# Patient Record
Sex: FEMALE | Race: WHITE | NOT HISPANIC OR LATINO | Employment: FULL TIME | URBAN - NONMETROPOLITAN AREA
[De-identification: names, ages, dates, MRNs, and addresses within clinical notes are randomized per-mention and may not be internally consistent; named-entity substitution may affect disease eponyms.]

---

## 2023-06-13 LAB — CHORIOGONADOTROPIN (MIU/ML) IN SER/PLAS: 1621 MIU/ML

## 2023-06-15 LAB — CHORIOGONADOTROPIN (MIU/ML) IN SER/PLAS: 1462 MIU/ML

## 2023-06-30 LAB — CHORIOGONADOTROPIN (MIU/ML) IN SER/PLAS: 23 MIU/ML

## 2023-07-14 LAB — CHORIOGONADOTROPIN (MIU/ML) IN SER/PLAS: <2 MIU/ML

## 2023-09-29 ENCOUNTER — TRANSCRIBE ORDERS (OUTPATIENT)
Dept: OBSTETRICS AND GYNECOLOGY | Facility: CLINIC | Age: 38
End: 2023-09-29
Payer: COMMERCIAL

## 2023-09-29 DIAGNOSIS — D24.2 BREAST FIBROADENOMA IN FEMALE, LEFT: Primary | ICD-10-CM

## 2023-10-06 PROBLEM — D24.2 FIBROADENOMA OF LEFT BREAST: Status: ACTIVE | Noted: 2023-10-06

## 2023-10-06 PROBLEM — O03.9 SAB (SPONTANEOUS ABORTION) (HHS-HCC): Status: ACTIVE | Noted: 2023-10-06

## 2023-10-06 PROBLEM — O20.0 THREATENED ABORTION (HHS-HCC): Status: ACTIVE | Noted: 2023-10-06

## 2023-10-06 PROBLEM — N63.23 MASS OF LOWER OUTER QUADRANT OF LEFT BREAST: Status: ACTIVE | Noted: 2023-10-06

## 2023-10-06 PROBLEM — N92.6 MISSED MENSES: Status: ACTIVE | Noted: 2023-10-06

## 2023-10-06 PROBLEM — N91.2 AMENORRHEA: Status: ACTIVE | Noted: 2023-10-06

## 2023-10-06 RX ORDER — ONDANSETRON 8 MG/1
1 TABLET, ORALLY DISINTEGRATING ORAL EVERY 6 HOURS
COMMUNITY
Start: 2022-07-25

## 2023-10-11 ENCOUNTER — LAB (OUTPATIENT)
Dept: LAB | Facility: LAB | Age: 38
End: 2023-10-11
Payer: COMMERCIAL

## 2023-10-11 ENCOUNTER — ROUTINE PRENATAL (OUTPATIENT)
Dept: OBSTETRICS AND GYNECOLOGY | Facility: CLINIC | Age: 38
End: 2023-10-11
Payer: COMMERCIAL

## 2023-10-11 VITALS — DIASTOLIC BLOOD PRESSURE: 62 MMHG | WEIGHT: 137 LBS | SYSTOLIC BLOOD PRESSURE: 104 MMHG | BODY MASS INDEX: 23.15 KG/M2

## 2023-10-11 DIAGNOSIS — Z3A.15 15 WEEKS GESTATION OF PREGNANCY (HHS-HCC): ICD-10-CM

## 2023-10-11 DIAGNOSIS — Z3A.15 15 WEEKS GESTATION OF PREGNANCY (HHS-HCC): Primary | ICD-10-CM

## 2023-10-11 DIAGNOSIS — N63.23 MASS OF LOWER OUTER QUADRANT OF LEFT BREAST: ICD-10-CM

## 2023-10-11 LAB
ABO GROUP (TYPE) IN BLOOD: NORMAL
ERYTHROCYTE [DISTWIDTH] IN BLOOD BY AUTOMATED COUNT: 12.7 % (ref 11.5–14.5)
HCT VFR BLD AUTO: 32 % (ref 36–46)
HGB BLD-MCNC: 10.5 G/DL (ref 12–16)
MCH RBC QN AUTO: 31.9 PG (ref 26–34)
MCHC RBC AUTO-ENTMCNC: 32.8 G/DL (ref 32–36)
MCV RBC AUTO: 97 FL (ref 80–100)
NRBC BLD-RTO: 0 /100 WBCS (ref 0–0)
PLATELET # BLD AUTO: 287 X10*3/UL (ref 150–450)
PMV BLD AUTO: 9.5 FL (ref 7.5–11.5)
RBC # BLD AUTO: 3.29 X10*6/UL (ref 4–5.2)
RH FACTOR (ANTIGEN D): NORMAL
WBC # BLD AUTO: 9.5 X10*3/UL (ref 4.4–11.3)

## 2023-10-11 PROCEDURE — 82105 ALPHA-FETOPROTEIN SERUM: CPT

## 2023-10-11 PROCEDURE — 83550 IRON BINDING TEST: CPT

## 2023-10-11 PROCEDURE — 87086 URINE CULTURE/COLONY COUNT: CPT | Mod: CMCLAB,SAMLAB | Performed by: OBSTETRICS & GYNECOLOGY

## 2023-10-11 PROCEDURE — 86780 TREPONEMA PALLIDUM: CPT

## 2023-10-11 PROCEDURE — 36415 COLL VENOUS BLD VENIPUNCTURE: CPT

## 2023-10-11 PROCEDURE — 84702 CHORIONIC GONADOTROPIN TEST: CPT

## 2023-10-11 PROCEDURE — 82746 ASSAY OF FOLIC ACID SERUM: CPT

## 2023-10-11 PROCEDURE — 86901 BLOOD TYPING SEROLOGIC RH(D): CPT

## 2023-10-11 PROCEDURE — 82728 ASSAY OF FERRITIN: CPT

## 2023-10-11 PROCEDURE — 87340 HEPATITIS B SURFACE AG IA: CPT

## 2023-10-11 PROCEDURE — 86900 BLOOD TYPING SEROLOGIC ABO: CPT

## 2023-10-11 PROCEDURE — 99213 OFFICE O/P EST LOW 20 MIN: CPT | Performed by: OBSTETRICS & GYNECOLOGY

## 2023-10-11 PROCEDURE — 85027 COMPLETE CBC AUTOMATED: CPT

## 2023-10-11 PROCEDURE — 82607 VITAMIN B-12: CPT

## 2023-10-11 PROCEDURE — 86317 IMMUNOASSAY INFECTIOUS AGENT: CPT

## 2023-10-11 PROCEDURE — 87800 DETECT AGNT MULT DNA DIREC: CPT

## 2023-10-11 PROCEDURE — 87389 HIV-1 AG W/HIV-1&-2 AB AG IA: CPT

## 2023-10-11 PROCEDURE — 86336 INHIBIN A: CPT

## 2023-10-11 PROCEDURE — 82677 ASSAY OF ESTRIOL: CPT

## 2023-10-11 RX ORDER — VIT C/E/ZN/COPPR/LUTEIN/ZEAXAN 250MG-90MG
2000 CAPSULE ORAL
COMMUNITY
Start: 2023-04-06

## 2023-10-11 NOTE — PROGRESS NOTES
Subjective   Patient ID [id number]   Anahi Issa is a 37 y.o.  at 15w2d with a working estimated date of delivery of 2024, by Ultrasound who presents for an initial prenatal visit.     Chief Complaint   Patient presents with    Routine Prenatal Visit     Patient presents for 15 weeks 2 days check. Patient has concerns that she is a UTI.     Protein negative, glucose negative.           Her pregnancy is complicated by:  None    OB History    Para Term  AB Living   4 1 1 0 2 1   SAB IAB Ectopic Multiple Live Births   2 0 0 0 1      # Outcome Date GA Lbr Chato/2nd Weight Sex Delivery Anes PTL Lv   4 Current            3 SAB 23           2 SAB 2023           1 Term 14 40w0d  2722 g F Vag-Spont   SIMON          Review of Systems:   Constitutional: No fever or chills  Respiratory: No shortness of breath, or cough  Cardiovascular: No chest pain or syncope  Breasts: No masses, no nipple discharge  Gastrointestinal: No diarrhea, no abdominal pain  Genitourinary: No dysuria or frequency  Gynecology: Negative except as noted in history of present illness  All other: All other systems reviewed and negative for complaint    Objective   Physical Exam  Weight: 62.1 kg (137 lb)  Expected Total Weight Gain: Could not be calculated   Pregravid BMI: Could not be calculated  BP: 104/62    Fetal Heart Rate: 150     OBGyn Exam      Assessment/Plan   Problem List Items Addressed This Visit    None  Visit Diagnoses       15 weeks gestation of pregnancy    -  Primary    Relevant Orders    CBC Anemia Panel With Reflex,Pregnancy    Abo/Rh    Hepatitis B Surface Antigen    Rubella Antibody, IgG    C. Trachomatis / N. Gonorrhoeae, Amplified Detection    Urine Culture    HIV 1/2 Antigen/Antibody Screen with Reflex to Confirmation    Syphilis Screen with Reflex    Quad Screen    US OB detail fetal anatomy            Prenatal Labs ordered  First trimester screening and second trimester screening discussed.  Patient decided to have the quad screen performed.  Follow up in 4 weeks for return OB visit.

## 2023-10-12 ENCOUNTER — TELEPHONE (OUTPATIENT)
Dept: OBSTETRICS AND GYNECOLOGY | Facility: CLINIC | Age: 38
End: 2023-10-12
Payer: COMMERCIAL

## 2023-10-12 ENCOUNTER — PREP FOR PROCEDURE (OUTPATIENT)
Dept: OBSTETRICS AND GYNECOLOGY | Facility: CLINIC | Age: 38
End: 2023-10-12
Payer: COMMERCIAL

## 2023-10-12 DIAGNOSIS — D24.2 BREAST FIBROADENOMA IN FEMALE, LEFT: ICD-10-CM

## 2023-10-12 DIAGNOSIS — Z3A.15 15 WEEKS GESTATION OF PREGNANCY (HHS-HCC): Primary | ICD-10-CM

## 2023-10-12 DIAGNOSIS — N63.23 MASS OF LOWER OUTER QUADRANT OF LEFT BREAST: ICD-10-CM

## 2023-10-12 LAB
FERRITIN SERPL-MCNC: 167 NG/ML
FOLATE SERPL-MCNC: 16.6 NG/ML
HBV SURFACE AG SERPL QL IA: NONREACTIVE
HIV 1+2 AB+HIV1 P24 AG SERPL QL IA: NONREACTIVE
IRON SATN MFR SERPL: 18 %
IRON SERPL-MCNC: 78 UG/DL
REFLEX ADDED, ANEMIA PANEL: NORMAL
RUBV IGG SERPL IA-ACNC: 0.6 IA
RUBV IGG SERPL QL IA: NEGATIVE
T PALLIDUM AB SER QL: NONREACTIVE
TIBC SERPL-MCNC: 436 UG/DL
UIBC SERPL-MCNC: 358 UG/DL
VIT B12 SERPL-MCNC: 370 PG/ML

## 2023-10-12 NOTE — RESULT ENCOUNTER NOTE
Please inform patient that her hemoglobin is slightly low.  Recommend that she start taking an iron supplement daily.

## 2023-10-13 LAB
C TRACH RRNA SPEC QL NAA+PROBE: NEGATIVE
N GONORRHOEA DNA SPEC QL PROBE+SIG AMP: NEGATIVE

## 2023-10-14 LAB
2ND TRIMESTER 4 SCREEN SERPL-IMP: ABNORMAL
AFP ADJ MOM SERPL: 0.95
AFP SERPL-MCNC: 31.2 NG/ML
AGE AT DELIVERY: 37.3 YR
FET TS 18 RISK FROM MAT AGE: ABNORMAL
FET TS 21 RISK FROM MAT AGE: 175
GA METHOD: ABNORMAL
GA: 15.3 WEEKS
HCG ADJ MOM SERPL: 0.93
HCG SERPL-ACNC: ABNORMAL MIU/ML
IDDM PATIENT QL: NO
INHIBIN A ADJ MOM SERPL: 2.18
INHIBIN A SERPL-MCNC: 377.67 PG/ML
MULTIPLE PREGNANCY: NO
NEURAL TUBE DEFECT RISK FETUS: ABNORMAL %
SERVICE CMNT-IMP: ABNORMAL
TS 18 RISK FETUS: ABNORMAL
TS 21 RISK FETUS: 82
U ESTRIOL ADJ MOM SERPL: 0.53
U ESTRIOL SERPL-MCNC: 0.42 NG/ML

## 2023-10-15 DIAGNOSIS — O28.0 ABNORMAL QUAD SCREEN: Primary | ICD-10-CM

## 2023-10-16 ENCOUNTER — TELEPHONE (OUTPATIENT)
Dept: GENETICS | Facility: CLINIC | Age: 38
End: 2023-10-16
Payer: COMMERCIAL

## 2023-10-16 NOTE — RESULT ENCOUNTER NOTE
Please inform patient that the Quad screen shows increased risk for Down Syndrome.  Order for Genetic counselling and [facility] MAC has been placed. Please coordinate

## 2023-10-16 NOTE — TELEPHONE ENCOUNTER
----- Message from ELIZABETH Browning sent at 10/16/2023  8:20 AM EDT -----    ----- Message -----  From: Aletha Mancilla  Sent: 10/14/2023   9:05 AM EDT  To: ELIZABETH Browning

## 2023-10-16 NOTE — TELEPHONE ENCOUNTER
Left message for patient requesting call back to discuss quad screen results. My direct number is [phone].    Nona Avila C

## 2023-10-16 NOTE — TELEPHONE ENCOUNTER
Patient returned my call to discuss genetic screening that she had through a Quad Screen. Reviewed the methodology of the screen.     Ms. Issa's pregnancy screened positive for an increased risk of Down syndrome. Her risk is 1 in 82 (1.2%). She is low risk for open spina bifida (<1 in 10,000) and Trisomy 18 (not increased from age related risk of 1 in 683). We reviewed that maternal age at the time of delivery is accounted for in this risk estimation.     Ms. Issa was offered a genetic counseling appointment to discuss options for additional testing. She stated that she would prefer virtual, and is agreeable to this appointment. She was transferred to the [facility] scheduling staff to schedule an appointment.     If Ms. Issa has additional questions, she can contact the [facility] at [phone].    Nona Avila, [facility]

## 2023-10-16 NOTE — RESULT ENCOUNTER NOTE
Patient is aware of results and elected to proceed with genetic counseling with Leslie Andrade on 10/25/23.

## 2023-10-18 ENCOUNTER — HOSPITAL ENCOUNTER (OUTPATIENT)
Dept: RADIOLOGY | Facility: HOSPITAL | Age: 38
Discharge: HOME | End: 2023-10-18
Payer: COMMERCIAL

## 2023-10-18 DIAGNOSIS — D24.9 BENIGN NEOPLASM OF UNSPECIFIED BREAST: ICD-10-CM

## 2023-10-18 DIAGNOSIS — Z3A.15 15 WEEKS GESTATION OF PREGNANCY (HHS-HCC): ICD-10-CM

## 2023-10-18 PROCEDURE — 76642 ULTRASOUND BREAST LIMITED: CPT | Mod: LEFT SIDE | Performed by: RADIOLOGY

## 2023-10-18 PROCEDURE — 76642 ULTRASOUND BREAST LIMITED: CPT | Mod: LT

## 2023-10-23 ENCOUNTER — APPOINTMENT (OUTPATIENT)
Dept: RADIOLOGY | Facility: HOSPITAL | Age: 38
End: 2023-10-23
Payer: COMMERCIAL

## 2023-10-25 ENCOUNTER — TELEMEDICINE CLINICAL SUPPORT (OUTPATIENT)
Dept: GENETICS | Facility: CLINIC | Age: 38
End: 2023-10-25
Payer: COMMERCIAL

## 2023-10-25 DIAGNOSIS — O28.5 ABNORMAL CHROMOSOMAL AND GENETIC FINDING ON ANTENATAL SCREENING OF MOTHER: Primary | ICD-10-CM

## 2023-10-25 PROCEDURE — GENMD PR GENETICS VISIT (MEDICAID/MEDICARE): Performed by: GENETIC COUNSELOR, MS

## 2023-10-26 NOTE — PROGRESS NOTES
Anahi Issa is a 37 y.o. old,  SAB2, female who was approximately 17 weeks and 2 days pregnant at the time of our appointment with an EDC of 24.  She was referred for genetic counseling to discuss her genetic screening and testing options due to an increased risk QUAD screen.  The appointment was conducted by Mary Muniz, Genetic Counseling Intern, in conjunction with Leslie Andrade, MS Licensed Genetic Counselor.      PAST HISTORY:  The patient reported that she and her partner had two pregnancy losses together at approximately 6-8 weeks.  The patient reported that she did not have any genetic testing performed on the products of conception from either of these losses.  The patient reported that she had a dating ultrasound in her OB's office in the first trimester that established a due date of 24.  The patient had a QUAD screen sent to [facility] which indicated an increased risk for Down syndrome for the patient's pregnancy (screen risk 1 in 82).  The screen was risk reducing for Trisomy 18 and ONTD's.      The patient reported that she is currently smoking cigarettes.  She was informed that smoking increases the risk to have a baby with low birth weight, placental disorders, and  delivery.  Quitting smoking at anytime during the pregnancy has been shown to be beneficial for the fetus.      FAMILY HISTORY:  Medical and family histories were reviewed and the following concerns regarding this pregnancy were apparent:      Ms. Issa:  Father- high blood pressure    Her partner:  Mother- epilepsy, breast cancer (age of diagnosis is unknown), ovarian cancer (age of diagnosis is unknown),      The remainder of the family history was negative for birth defects, intellectual disability, recurrent pregnancy loss, or recognized inherited conditions.  Consanguinity was denied.  The Pedigree is available for a full review of the family history.      ETHNICITY:  The patient reported that she is of  English/[place] ethnicity.  The patient reported that her partner is of English/ ethnicity.  Ashkenazi Sabianist Ancestry was denied.    We discussed the availability, benefits, and limitations of carrier screening for cystic fibrosis (CF), spinal muscular atrophy (SMA), and hemoglobinopathies/thalassemias. We reviewed the carrier frequencies of these conditions, varied clinical manifestations, and their autosomal recessive inheritance.  If both members of the couple are found to be carriers for the same autosomal recessive condition, there is a 25% chance for an affected child and prenatal diagnosis would be available. Negative carrier screening does not rule out the possibility of being a carrier.  screening is available for CF, hemoglobinopathies, and thalassemias, and SMA.  We also discussed the availability of more expanded/pan ethnic carrier screening for additional, primarily autosomal recessive, conditions. We discussed the pros and cons of expanded carrier screening including the higher likelihood of being identified as a carrier for at least one condition on a larger panel. Approximately 4% of couples are found to be at risk to have a child with a genetic disorder based on this screening. In rare cases, expanded carrier screening results may have health implications for the tested individual.      After careful consideration, the patient declined all carrier screening.      COUNSELING:  The following information was discussed with your patient:    1. The patient's QUAD screen results. The screen indicated a 1 in 82 risk for Down syndrome for the patient's pregnancy. We discussed that this is increased from the patient's age related risk for her pregnancy to have Down syndrome of 1 in 175. We discussed the PPV, technology, limitations, and the detection, false positive, and false negative rates of this screen. The clinical features of Down syndrome which include: varying levels of  intellectual disability with most individuals falling into the mild- moderate intellectual disability range. About 50% of children with Down syndrome have a heart defect. We discussed health concerns such as hypothyroidism, digestive problems, and other health issues.     We discussed the increased risk for placental complications with abnormal maternal serum screening as well as the increased risk for other chromosomal abnormalities.      2. The availability, benefits and limitations of ultrasound study. An ultrasound study is recommended at 12-14 weeks gestation for assessment of nuchal translucency and again at 19-20 weeks gestation to survey fetal organs. An ultrasound study in the second trimester can identify 50% of Down syndrome cases and 80-90% of trisomy 18 or trisomy 13 cases.     3. The availability, benefits and limitations of standard cell-free DNA screening.  We discussed the methodology for this screen, which includes using cell-free DNA obtained from a mother's blood (derived from the placenta) to screen for the presence of common chromosomal abnormalities. Depending on the laboratory used, there is a >99% sensitivity for Down syndrome, at least 97.4% sensitivity for trisomy 18, and at least 91% sensitivity for trisomy 13.  Specificity for these trisomies is >99%. Although sensitivity and specificity rates are high, in our experience the positive predictive value is dependent on many factors; whereas false negative results are rare.  In addition, anticoagulants, maternal chromosome abnormality, fibroids or malignancy may impact results and/or be associated with inconclusive or other abnormal findings.  This is not a diagnostic test.  Therefore, in the event of an abnormal result, prenatal diagnosis through amniocentesis is recommended to confirm the findings, if confirmation is desired.  Results take approximately 7-10 days.      4. The availability, benefits, and limitations of the Materni[facility] GENOME  cell free DNA screen.  This test is designed to screen for any chromosome abnormality, including deletions and duplications, that are greater than or equal to 7 Mb in size, with at least 95.9% sensitivity and 99.9% specificity. It also includes screening for select microdeletions including 22q11 deletion (associated with DiGeorge syndrome), 15q11 deletion (associated with Prader-Willi/Angelman syndromes), 11q23 deletion (associated with Ursula syndrome), 8q24 deletion (associated with Joselo-Giedion syndrome), 5p15 deletion (associated with Cri-du-Chat syndrome), 4p16 deletion (associated with Garcia-Hirschhorn syndrome), and 1p36 deletion (associated with 1p36 deletion syndrome). This is the most comprehensive fetal chromosome test currently clinically available noninvasively. As with standard cell-free DNA analysis, false positives and false negatives are possible.   In addition, anticoagulants, maternal chromosome abnormality, fibroids or malignancy may impact results and/or be associated with inconclusive or other abnormal findings.  In the event of an abnormal result, prenatal diagnosis through amniocentesis should be considered to confirm the findings, if confirmation is desired.      5. The methods, benefits, limitations and risks of amniocentesis.  There is an approximate 1 in 400 risk of complications including a 1 in 800 risk of miscarriage.    6. 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 Approximately 15-20% of all pregnancies end in a miscarriage. Approximately 50% of all first trimester miscarriages are due to a chromosomal abnormality. Most of these chromosomal abnormalities are not inherited and are, therefore, sporadic events. Also, the risk for miscarriage increases as maternal age increases.     In 5% of couples that have experienced two or more unexplained miscarriages, a chromosomal rearrangement can be found in one of the partners. Chromosome analysis was offered to  the patient and should be offered to her partner she had her losses with to determine if either is a balanced translocation carrier. If one member of the couple is found to be a carrier of a translocation, then prenatal diagnosis and the option of preimplantation genetic diagnosis should be offered.   The patient declined a karyotype.      There are various other causes of miscarriage including maternal infection, hormonal imbalance, structural uterine abnormalities, and chromosomal abnormalities. In order to rule out these potential causes, the following tests may be considered by the patient's OBGYN/MFM if they determine them to be necessary and they have not already been performed: thyroid studies (TSH, T3, and T4), dRVTT, anti-nuclear antibodies, anti-cardiolipin antibody studies (IgG, IgM, IgA), anti-ß2-glycoprotein I antibodies, other laboratory studies, and ultrasound study of the uterus to rule out congenital malformations.     7. Additional Family History Information:  The patient reported that her partner's mother was diagnosed with both breast and ovarian cancer and  at the age of 45.  Cancer genetic counseling is recommended for all close family members of this individual (including the patient's partner).  An appointment with our Cancer Genetics Clinic can be made by calling [phone].  At that time, an assessment of the family history of cancer, cancer genetic testing, personal cancer risk and options for risk reduction would then be discussed.   If these individuals are unable or unwilling to have cancer genetic counseling, other family members may seek cancer genetic counseling based on this family history.    The patient has a family history of high blood pressure. Often, high blood pressure is due to a combination of genetic and environmental factors (which often remain unknown).  The risk to have high blood pressure depends on the amount and degree of affected family members.  It also  depends on if an underlying genetic cause of these conditions can be identified.   With this history, the patient and her offspring are at an increased risk for high blood pressure and this information should be shared with all relevant primary care providers.      The patient's partner's mother had seizures. We reviewed that we are learning more about the genetics of seizure disorders, and sometimes seizures run in families. General genetic counseling is available for any family member that has a concern about a family history of seizures and an appointment can be made by calling [phone]. If an underlying genetic etiology in the family is determined, precise recurrence risks could be provided, and testing for other family members may be available if clinically indicated.      DISPOSITION:  The patient stated that she understood the above information and elected to proceed with standard cell-free DNA screening to [facility].  All other screening for chromosomal abnormalities (MaterniTGenome) was declined.  A bloody karyotype was declined.  All carrier screening was declined. Diagnostic testing in the form of genetic amniocentesis was declined.  The patient stated that she would not terminate a pregnancy for any reason, including any chromosomal abnormality (including Down syndrome) or genetic condition in the fetus.    We recommend that the patient have her anatomy ultrasound at a [facility] Location.  The patient was offered the option to  a cell-free DNA screening kit from any of our Imaging locations at any time.  The patient stated that she preferred to  this kit at her anatomy ultrasound.  The patient scheduled an anatomy ultrasound at the [facility] Location on 11/9/28.  We recommend a growth ultrasound at 28 weeks.     Thank you for allowing us to participate in the care of your patient.  Should you or your patient have any questions, please do not hesitate to contact our  office at [phone].    Licensed Genetic Counselor Leslie Andrade MS, [facility] spent approximately 30 minutes with a virtual video appointment with the patient.      Sincerely,     Leslie Andrade MS  Licensed Genetic Counselor    Reviewed by:  Dr. María Funk MD  OBN

## 2023-11-08 ENCOUNTER — APPOINTMENT (OUTPATIENT)
Dept: OBSTETRICS AND GYNECOLOGY | Facility: CLINIC | Age: 38
End: 2023-11-08
Payer: COMMERCIAL

## 2023-11-08 ENCOUNTER — APPOINTMENT (OUTPATIENT)
Dept: RADIOLOGY | Facility: HOSPITAL | Age: 38
End: 2023-11-08
Payer: COMMERCIAL

## 2023-11-09 ENCOUNTER — ANCILLARY PROCEDURE (OUTPATIENT)
Dept: RADIOLOGY | Facility: CLINIC | Age: 38
End: 2023-11-09
Payer: COMMERCIAL

## 2023-11-09 DIAGNOSIS — O28.5 ABNORMAL CHROMOSOMAL AND GENETIC FINDING ON ANTENATAL SCREENING OF MOTHER: ICD-10-CM

## 2023-11-09 PROCEDURE — 76811 OB US DETAILED SNGL FETUS: CPT | Performed by: STUDENT IN AN ORGANIZED HEALTH CARE EDUCATION/TRAINING PROGRAM

## 2023-11-09 PROCEDURE — 76811 OB US DETAILED SNGL FETUS: CPT

## 2023-11-10 NOTE — RESULT ENCOUNTER NOTE
Ultrasound performed by [facility] for anatomy is normal.  [facility] recommends follow-up ultrasound at 28 weeks for growth.

## 2023-11-15 ENCOUNTER — ROUTINE PRENATAL (OUTPATIENT)
Dept: OBSTETRICS AND GYNECOLOGY | Facility: CLINIC | Age: 38
End: 2023-11-15
Payer: COMMERCIAL

## 2023-11-15 VITALS — BODY MASS INDEX: 23.66 KG/M2 | SYSTOLIC BLOOD PRESSURE: 112 MMHG | DIASTOLIC BLOOD PRESSURE: 64 MMHG | WEIGHT: 140 LBS

## 2023-11-15 DIAGNOSIS — Z3A.20 20 WEEKS GESTATION OF PREGNANCY (HHS-HCC): Primary | ICD-10-CM

## 2023-11-15 PROCEDURE — 99213 OFFICE O/P EST LOW 20 MIN: CPT | Performed by: OBSTETRICS & GYNECOLOGY

## 2023-11-15 NOTE — PROGRESS NOTES
Subjective   Patient ID [id number]   Anahi Issa is a 37 y.o.  at 20w2d with a working estimated date of delivery of 2024, by Ultrasound who presents for a routine prenatal visit. She denies vaginal bleeding, leakage of fluid, decreased fetal movements, or contractions.  Patient had her anatomy scan performed by [facility] which was normal.  [facility] recommends a follow-up ultrasound at 28 weeks for growth.  Patient desires to have the follow-up ultrasound performed locally.    Chief Complaint   Patient presents with    Routine Prenatal Visit     Patient has a couple of questions regarding delivery. Urine is negative for glucose and has 30mg/dL of protein.        Her pregnancy is complicated by:  Quad screen showing positive risk for Down's.    Objective   Physical Exam  Weight: 63.5 kg (140 lb)  Expected Total Weight Gain: Could not be calculated   Pregravid BMI: Could not be calculated  BP: 112/64  Fetal Heart Rate: 150 Fundal Height (cm): 20 cm    Prenatal Labs  Urine dip:  Lab Results   Component Value Date    KETONESU NEGATIVE 2020       Lab Results   Component Value Date    HGB 10.5 (L) 10/11/2023    HCT 32.0 (L) 10/11/2023    ABO O 10/11/2023    HEPBSAG Nonreactive 10/11/2023       Assessment/Plan   Problem List Items Addressed This Visit    None  Visit Diagnoses       20 weeks gestation of pregnancy    -  Primary            Continue prenatal vitamin.  Labs reviewed.    Follow up in 4 weeks for a routine prenatal visit.

## 2023-12-13 ENCOUNTER — ROUTINE PRENATAL (OUTPATIENT)
Dept: OBSTETRICS AND GYNECOLOGY | Facility: CLINIC | Age: 38
End: 2023-12-13
Payer: COMMERCIAL

## 2023-12-13 VITALS — SYSTOLIC BLOOD PRESSURE: 110 MMHG | BODY MASS INDEX: 24.47 KG/M2 | WEIGHT: 144.8 LBS | DIASTOLIC BLOOD PRESSURE: 64 MMHG

## 2023-12-13 DIAGNOSIS — Z3A.23 23 WEEKS GESTATION OF PREGNANCY (HHS-HCC): Primary | ICD-10-CM

## 2023-12-13 PROCEDURE — 99213 OFFICE O/P EST LOW 20 MIN: CPT | Performed by: OBSTETRICS & GYNECOLOGY

## 2023-12-13 NOTE — PROGRESS NOTES
Subjective   Patient ID [id number]   Anahi Issa is a 37 y.o.  at 24w2d with a working estimated date of delivery of 2024, by Ultrasound who presents for a routine prenatal visit. She denies vaginal bleeding, leakage of fluid, decreased fetal movements, or contractions.    Chief Complaint   Patient presents with    Routine Prenatal Visit     Patient has no concerns at this time. Patient given the 1 hour GTT information. Urine is negative for glucose and has trace protein.        Objective   Physical Exam  Weight: 65.7 kg (144 lb 12.8 oz)  Expected Total Weight Gain: Could not be calculated   Pregravid BMI: Could not be calculated  BP: 110/64  Fetal Heart Rate: 148 Fundal Height (cm): 24 cm    Prenatal Labs  Urine dip:  Lab Results   Component Value Date    KETONESU NEGATIVE 2020       Lab Results   Component Value Date    HGB 10.5 (L) 10/11/2023    HCT 32.0 (L) 10/11/2023    ABO O 10/11/2023    HEPBSAG Nonreactive 10/11/2023       Assessment/Plan   Problem List Items Addressed This Visit    None  Visit Diagnoses       23 weeks gestation of pregnancy    -  Primary    Relevant Orders    US OB follow UP transabdominal approach    Glucose, 1 Hour Screen, Pregnancy    CBC Anemia Panel With Reflex,Pregnancy            Continue prenatal vitamin.  Labs reviewed.    Ultrasound in 4 weeks for growth due to positive quad screen for Down's.  Follow up in 4 weeks for a routine prenatal visit.

## 2023-12-14 ENCOUNTER — TELEPHONE (OUTPATIENT)
Dept: GENETICS | Facility: CLINIC | Age: 38
End: 2023-12-14
Payer: COMMERCIAL

## 2023-12-14 NOTE — TELEPHONE ENCOUNTER
12/14/23: I left the patient a voicemail.      12/14/2023: I spoke to the patient as she never had her cell-free DNA analysis drawn on 11/9/2023 as planned.  The patient stated that after careful consideration, she decided to decline this screen.  The patient is declining all genetic screening and diagnostic testing in pregnancy.      Leslie Andrade MS  Licensed Genetic Counselor    Reviewed by:

## 2024-01-06 ENCOUNTER — LAB (OUTPATIENT)
Dept: LAB | Facility: LAB | Age: 39
End: 2024-01-06
Payer: COMMERCIAL

## 2024-01-06 DIAGNOSIS — Z3A.23 23 WEEKS GESTATION OF PREGNANCY (HHS-HCC): ICD-10-CM

## 2024-01-06 LAB
ERYTHROCYTE [DISTWIDTH] IN BLOOD BY AUTOMATED COUNT: 13 % (ref 11.5–14.5)
GLUCOSE 1H P 50 G GLC PO SERPL-MCNC: 116 MG/DL
HCT VFR BLD AUTO: 33.5 % (ref 36–46)
HGB BLD-MCNC: 11.1 G/DL (ref 12–16)
MCH RBC QN AUTO: 32.7 PG (ref 26–34)
MCHC RBC AUTO-ENTMCNC: 33.1 G/DL (ref 32–36)
MCV RBC AUTO: 99 FL (ref 80–100)
NRBC BLD-RTO: 0 /100 WBCS (ref 0–0)
PLATELET # BLD AUTO: 251 X10*3/UL (ref 150–450)
RBC # BLD AUTO: 3.39 X10*6/UL (ref 4–5.2)
WBC # BLD AUTO: 10 X10*3/UL (ref 4.4–11.3)

## 2024-01-06 PROCEDURE — 36415 COLL VENOUS BLD VENIPUNCTURE: CPT

## 2024-01-06 PROCEDURE — 85027 COMPLETE CBC AUTOMATED: CPT

## 2024-01-06 PROCEDURE — 82947 ASSAY GLUCOSE BLOOD QUANT: CPT

## 2024-01-07 LAB — REFLEX ADDED, ANEMIA PANEL: NORMAL

## 2024-01-12 ENCOUNTER — ROUTINE PRENATAL (OUTPATIENT)
Dept: OBSTETRICS AND GYNECOLOGY | Facility: CLINIC | Age: 39
End: 2024-01-12
Payer: COMMERCIAL

## 2024-01-12 ENCOUNTER — HOSPITAL ENCOUNTER (OUTPATIENT)
Dept: RADIOLOGY | Facility: HOSPITAL | Age: 39
Discharge: HOME | End: 2024-01-12
Payer: COMMERCIAL

## 2024-01-12 VITALS — SYSTOLIC BLOOD PRESSURE: 104 MMHG | WEIGHT: 148.6 LBS | BODY MASS INDEX: 25.11 KG/M2 | DIASTOLIC BLOOD PRESSURE: 64 MMHG

## 2024-01-12 DIAGNOSIS — Z3A.28 28 WEEKS GESTATION OF PREGNANCY (HHS-HCC): Primary | ICD-10-CM

## 2024-01-12 DIAGNOSIS — Z3A.23 23 WEEKS GESTATION OF PREGNANCY (HHS-HCC): ICD-10-CM

## 2024-01-12 PROCEDURE — 76816 OB US FOLLOW-UP PER FETUS: CPT

## 2024-01-12 PROCEDURE — 76816 OB US FOLLOW-UP PER FETUS: CPT | Performed by: RADIOLOGY

## 2024-01-12 PROCEDURE — 99213 OFFICE O/P EST LOW 20 MIN: CPT | Performed by: OBSTETRICS & GYNECOLOGY

## 2024-01-12 NOTE — PROGRESS NOTES
Subjective   Patient ID [id number]   Anahi Issa is a 38 y.o.  at 28w4d with a working estimated date of delivery of 2024, by Ultrasound who presents for a routine prenatal visit. She denies vaginal bleeding, leakage of fluid, decreased fetal movements, or contractions.    Chief Complaint   Patient presents with    Routine Prenatal Visit     Patient has no concerns at this time. Urine is negative for glucose and protein. Patient states the morning sickness is coming back.        Objective   Physical Exam  Weight: 67.4 kg (148 lb 9.6 oz)  Expected Total Weight Gain: Could not be calculated   Pregravid BMI: Could not be calculated  BP: 104/64  Fetal Heart Rate: 144 Fundal Height (cm): 28 cm    Prenatal Labs  Urine dip:  Lab Results   Component Value Date    KETONESU NEGATIVE 2020       Lab Results   Component Value Date    HGB 11.1 (L) 2024    HCT 33.5 (L) 2024    ABO O 10/11/2023    HEPBSAG Nonreactive 10/11/2023       Assessment/Plan   Problem List Items Addressed This Visit    None  Visit Diagnoses       28 weeks gestation of pregnancy    -  Primary            Continue prenatal vitamin.  Labs reviewed.    Patient had her follow-up ultrasound for growth today, and the results are pending.  Follow up in 2 weeks for a routine prenatal visit.

## 2024-01-26 ENCOUNTER — ROUTINE PRENATAL (OUTPATIENT)
Dept: OBSTETRICS AND GYNECOLOGY | Facility: CLINIC | Age: 39
End: 2024-01-26
Payer: COMMERCIAL

## 2024-01-26 VITALS — WEIGHT: 148.6 LBS | SYSTOLIC BLOOD PRESSURE: 116 MMHG | DIASTOLIC BLOOD PRESSURE: 64 MMHG | BODY MASS INDEX: 25.11 KG/M2

## 2024-01-26 DIAGNOSIS — Z3A.30 30 WEEKS GESTATION OF PREGNANCY (HHS-HCC): Primary | ICD-10-CM

## 2024-01-26 PROCEDURE — 99213 OFFICE O/P EST LOW 20 MIN: CPT | Performed by: OBSTETRICS & GYNECOLOGY

## 2024-01-26 NOTE — PROGRESS NOTES
Subjective   Patient ID [id number]   Anahi Issa is a 38 y.o.  at 30w4d with a working estimated date of delivery of 2024, by Ultrasound who presents for a routine prenatal visit. She denies vaginal bleeding, leakage of fluid, decreased fetal movements, or contractions.    Chief Complaint   Patient presents with    Routine Prenatal Visit     Patient has no concerns at this time. Urine is negative for glucose and has 30mg/dL of protein.          Objective   Physical Exam  Weight: 67.4 kg (148 lb 9.6 oz)  Expected Total Weight Gain: Could not be calculated   Pregravid BMI: Could not be calculated  BP: 116/64  Fetal Heart Rate: 142 Fundal Height (cm): 30 cm    Prenatal Labs  Urine dip:  Lab Results   Component Value Date    KETONESU NEGATIVE 2020       Lab Results   Component Value Date    HGB 11.1 (L) 2024    HCT 33.5 (L) 2024    ABO O 10/11/2023    HEPBSAG Nonreactive 10/11/2023       Assessment/Plan   Problem List Items Addressed This Visit    None  Visit Diagnoses       30 weeks gestation of pregnancy    -  Primary            Continue prenatal vitamin.  Labs reviewed.    Follow up in 2 weeks for a routine prenatal visit.

## 2024-02-09 ENCOUNTER — ROUTINE PRENATAL (OUTPATIENT)
Dept: OBSTETRICS AND GYNECOLOGY | Facility: CLINIC | Age: 39
End: 2024-02-09
Payer: COMMERCIAL

## 2024-02-09 VITALS — BODY MASS INDEX: 25.49 KG/M2 | WEIGHT: 150.8 LBS | DIASTOLIC BLOOD PRESSURE: 76 MMHG | SYSTOLIC BLOOD PRESSURE: 124 MMHG

## 2024-02-09 DIAGNOSIS — Z3A.32 32 WEEKS GESTATION OF PREGNANCY (HHS-HCC): Primary | ICD-10-CM

## 2024-02-09 PROCEDURE — 99213 OFFICE O/P EST LOW 20 MIN: CPT | Performed by: OBSTETRICS & GYNECOLOGY

## 2024-02-09 NOTE — PROGRESS NOTES
Subjective   Patient ID [id number]   Anahi Vicente is a 38 y.o.  at 32w4d with a working estimated date of delivery of 2024, by Ultrasound who presents for a routine prenatal visit. She denies vaginal bleeding, leakage of fluid, decreased fetal movements, or contractions.    Chief Complaint   Patient presents with    Routine Prenatal Visit     Patient is here for a 2 week OB Visit. Patient has no concerns, Glucose negative, Protein 30 MG in urine.         Objective   Physical Exam  Weight: 68.4 kg (150 lb 12.8 oz)  Expected Total Weight Gain: Could not be calculated   Pregravid BMI: Could not be calculated  BP: 124/76  Fetal Heart Rate: 144 Fundal Height (cm): 32 cm    Prenatal Labs  Urine dip:  Lab Results   Component Value Date    KETONESU NEGATIVE 2020       Lab Results   Component Value Date    HGB 11.1 (L) 2024    HCT 33.5 (L) 2024    ABO O 10/11/2023    HEPBSAG Nonreactive 10/11/2023       Assessment/Plan   Problem List Items Addressed This Visit    None  Visit Diagnoses       32 weeks gestation of pregnancy    -  Primary            Continue prenatal vitamin.  Labs reviewed.      Follow up in 2 weeks for a routine prenatal visit.

## 2024-02-23 ENCOUNTER — ROUTINE PRENATAL (OUTPATIENT)
Dept: OBSTETRICS AND GYNECOLOGY | Facility: CLINIC | Age: 39
End: 2024-02-23
Payer: COMMERCIAL

## 2024-02-23 VITALS — BODY MASS INDEX: 25.25 KG/M2 | SYSTOLIC BLOOD PRESSURE: 124 MMHG | DIASTOLIC BLOOD PRESSURE: 70 MMHG | WEIGHT: 149.4 LBS

## 2024-02-23 DIAGNOSIS — Z3A.34 34 WEEKS GESTATION OF PREGNANCY (HHS-HCC): Primary | ICD-10-CM

## 2024-02-23 PROCEDURE — 99213 OFFICE O/P EST LOW 20 MIN: CPT | Performed by: OBSTETRICS & GYNECOLOGY

## 2024-02-23 NOTE — PROGRESS NOTES
Subjective   Patient ID [id number]   Anahi Vicente is a 38 y.o.  at 34w4d with a working estimated date of delivery of 2024, by Ultrasound who presents for a routine prenatal visit. She denies vaginal bleeding, leakage of fluid, decreased fetal movements, or contractions.    Chief Complaint   Patient presents with    Routine Prenatal Visit     Patient has no concerns at this time. Urine is negative for glucose and 30mg/dL of protein.        Objective   Physical Exam  Weight: 67.8 kg (149 lb 6.4 oz)  Expected Total Weight Gain: 11.5 kg (25 lb)-16 kg (35 lb)   Pregravid BMI: 22.65  BP: 124/70  Fetal Heart Rate: 142 Fundal Height (cm): 34 cm    Prenatal Labs  Urine dip:  Lab Results   Component Value Date    KETONESU NEGATIVE 2020       Lab Results   Component Value Date    HGB 11.1 (L) 2024    HCT 33.5 (L) 2024    ABO O 10/11/2023    HEPBSAG Nonreactive 10/11/2023       Assessment/Plan   Problem List Items Addressed This Visit    None  Visit Diagnoses       34 weeks gestation of pregnancy    -  Primary            Continue prenatal vitamin.  Labs reviewed.    Follow up in 1 week for a routine prenatal visit.

## 2024-03-01 ENCOUNTER — ROUTINE PRENATAL (OUTPATIENT)
Dept: OBSTETRICS AND GYNECOLOGY | Facility: CLINIC | Age: 39
End: 2024-03-01
Payer: COMMERCIAL

## 2024-03-01 VITALS — BODY MASS INDEX: 25.59 KG/M2 | WEIGHT: 151.4 LBS | SYSTOLIC BLOOD PRESSURE: 120 MMHG | DIASTOLIC BLOOD PRESSURE: 70 MMHG

## 2024-03-01 DIAGNOSIS — Z3A.35 35 WEEKS GESTATION OF PREGNANCY (HHS-HCC): Primary | ICD-10-CM

## 2024-03-01 PROCEDURE — 99213 OFFICE O/P EST LOW 20 MIN: CPT | Performed by: OBSTETRICS & GYNECOLOGY

## 2024-03-01 PROCEDURE — 87081 CULTURE SCREEN ONLY: CPT

## 2024-03-01 NOTE — PROGRESS NOTES
Subjective   Patient ID [id number]   Anahi Vicente is a 38 y.o.  at 35w4d with a working estimated date of delivery of 2024, by Ultrasound who presents for a routine prenatal visit. She denies vaginal bleeding, leakage of fluid, decreased fetal movements, or contractions.    Chief Complaint   Patient presents with    Routine Prenatal Visit     Patient c/o back pain. Urine is negative for glucose and trace protein.        Objective   Physical Exam  Weight: 68.7 kg (151 lb 6.4 oz)  Expected Total Weight Gain: 11.5 kg (25 lb)-16 kg (35 lb)   Pregravid BMI: 22.65  BP: 120/70  Fetal Heart Rate: 140 Fundal Height (cm): 35 cm    Prenatal Labs  Urine dip:  Lab Results   Component Value Date    KETONESU NEGATIVE 2020       Lab Results   Component Value Date    HGB 11.1 (L) 2024    HCT 33.5 (L) 2024    ABO O 10/11/2023    HEPBSAG Nonreactive 10/11/2023       Assessment/Plan   Problem List Items Addressed This Visit    None  Visit Diagnoses       35 weeks gestation of pregnancy    -  Primary    Relevant Orders    US OB follow UP transabdominal approach    Group B Streptococcus (GBS) Prenatal Screen, Culture            Continue prenatal vitamin.  Labs reviewed.    Ultrasound for growth in 2 weeks.  Follow up in 1 week for a routine prenatal visit.

## 2024-03-04 LAB — GP B STREP GENITAL QL CULT: NORMAL

## 2024-03-08 ENCOUNTER — ROUTINE PRENATAL (OUTPATIENT)
Dept: OBSTETRICS AND GYNECOLOGY | Facility: CLINIC | Age: 39
End: 2024-03-08
Payer: COMMERCIAL

## 2024-03-08 VITALS — WEIGHT: 153.2 LBS | DIASTOLIC BLOOD PRESSURE: 72 MMHG | BODY MASS INDEX: 25.89 KG/M2 | SYSTOLIC BLOOD PRESSURE: 110 MMHG

## 2024-03-08 DIAGNOSIS — Z3A.36 36 WEEKS GESTATION OF PREGNANCY (HHS-HCC): Primary | ICD-10-CM

## 2024-03-08 PROCEDURE — 99213 OFFICE O/P EST LOW 20 MIN: CPT | Performed by: OBSTETRICS & GYNECOLOGY

## 2024-03-08 NOTE — PROGRESS NOTES
Subjective   Patient ID [id number]   Anahi Vicente is a 38 y.o.  at 36w4d with a working estimated date of delivery of 2024, by Ultrasound who presents for a routine prenatal visit. She denies vaginal bleeding, leakage of fluid, decreased fetal movements, or contractions.    Chief Complaint   Patient presents with    Routine Prenatal Visit     Patient here for an OB visit. Patient has no concerns. Glucose is negative Protein is 30 MG in urine.           Objective   Physical Exam  Weight: 69.5 kg (153 lb 3.2 oz)  Expected Total Weight Gain: 11.5 kg (25 lb)-16 kg (35 lb)   Pregravid BMI: 22.65  BP: 110/72  Fetal Heart Rate: 142 Fundal Height (cm): 35 cm    Prenatal Labs  Urine dip:  Lab Results   Component Value Date    KETONESU NEGATIVE 2020       Lab Results   Component Value Date    HGB 11.1 (L) 2024    HCT 33.5 (L) 2024    ABO O 10/11/2023    HEPBSAG Nonreactive 10/11/2023       Assessment/Plan   Problem List Items Addressed This Visit    None  Visit Diagnoses       36 weeks gestation of pregnancy    -  Primary            Continue prenatal vitamin.  Labs reviewed.    Follow up in 1 week for a routine prenatal visit.

## 2024-03-15 ENCOUNTER — ROUTINE PRENATAL (OUTPATIENT)
Dept: OBSTETRICS AND GYNECOLOGY | Facility: CLINIC | Age: 39
End: 2024-03-15
Payer: COMMERCIAL

## 2024-03-15 ENCOUNTER — HOSPITAL ENCOUNTER (OUTPATIENT)
Dept: RADIOLOGY | Facility: HOSPITAL | Age: 39
Discharge: HOME | End: 2024-03-15
Payer: COMMERCIAL

## 2024-03-15 VITALS — WEIGHT: 153.6 LBS | DIASTOLIC BLOOD PRESSURE: 72 MMHG | SYSTOLIC BLOOD PRESSURE: 120 MMHG | BODY MASS INDEX: 25.96 KG/M2

## 2024-03-15 DIAGNOSIS — Z3A.15 15 WEEKS GESTATION OF PREGNANCY (HHS-HCC): ICD-10-CM

## 2024-03-15 DIAGNOSIS — Z3A.37 37 WEEKS GESTATION OF PREGNANCY (HHS-HCC): Primary | ICD-10-CM

## 2024-03-15 PROCEDURE — 76816 OB US FOLLOW-UP PER FETUS: CPT

## 2024-03-15 PROCEDURE — 76816 OB US FOLLOW-UP PER FETUS: CPT | Performed by: RADIOLOGY

## 2024-03-15 PROCEDURE — 99213 OFFICE O/P EST LOW 20 MIN: CPT | Performed by: OBSTETRICS & GYNECOLOGY

## 2024-03-15 NOTE — PROGRESS NOTES
Subjective   Patient ID [id number]   Anahi Vicente is a 38 y.o.  at 37w4d with a working estimated date of delivery of 2024, by Ultrasound who presents for a routine prenatal visit. She denies vaginal bleeding, leakage of fluid, decreased fetal movements, or contractions.    Chief Complaint   Patient presents with    Routine Prenatal Visit     Patient c/o pelvic pressure. Urine is negative for glucose and protein.          Objective   Physical Exam  Weight: 69.7 kg (153 lb 9.6 oz)  Expected Total Weight Gain: 11.5 kg (25 lb)-16 kg (35 lb)   Pregravid BMI: 22.65  BP: 120/72  Fetal Heart Rate: 144 Fundal Height (cm): 36 cm    Prenatal Labs  Urine dip:  Lab Results   Component Value Date    KETONESU NEGATIVE 2020       Lab Results   Component Value Date    HGB 11.1 (L) 2024    HCT 33.5 (L) 2024    ABO O 10/11/2023    HEPBSAG Nonreactive 10/11/2023       Assessment/Plan   Problem List Items Addressed This Visit    None  Visit Diagnoses       37 weeks gestation of pregnancy    -  Primary            Continue prenatal vitamin.  Labs reviewed.    Follow up in 1 week for a routine prenatal visit.

## 2024-03-22 ENCOUNTER — ROUTINE PRENATAL (OUTPATIENT)
Dept: OBSTETRICS AND GYNECOLOGY | Facility: CLINIC | Age: 39
End: 2024-03-22
Payer: COMMERCIAL

## 2024-03-22 VITALS — DIASTOLIC BLOOD PRESSURE: 72 MMHG | BODY MASS INDEX: 26.26 KG/M2 | WEIGHT: 155.4 LBS | SYSTOLIC BLOOD PRESSURE: 124 MMHG

## 2024-03-22 DIAGNOSIS — Z3A.38 38 WEEKS GESTATION OF PREGNANCY (HHS-HCC): Primary | ICD-10-CM

## 2024-03-22 PROCEDURE — 99213 OFFICE O/P EST LOW 20 MIN: CPT | Performed by: OBSTETRICS & GYNECOLOGY

## 2024-03-22 NOTE — PROGRESS NOTES
Subjective   Patient ID [id number]   Anahi Vicente is a 38 y.o.  at 38w4d with a working estimated date of delivery of 2024, by Ultrasound who presents for a routine prenatal visit. She denies vaginal bleeding, leakage of fluid, decreased fetal movements, or contractions.    Chief Complaint   Patient presents with    Routine Prenatal Visit     Patient c/o back pain, pelvic pain and pelvic pressure. Urine is negative for glucose and 30mg/dL protein.        Objective   Physical Exam  Weight: 70.5 kg (155 lb 6.4 oz)  Expected Total Weight Gain: 11.5 kg (25 lb)-16 kg (35 lb)   Pregravid BMI: 22.65  BP: 124/72  Fetal Heart Rate: 142 Fundal Height (cm): 36 cm    Prenatal Labs  Urine dip:  Lab Results   Component Value Date    KETONESU NEGATIVE 2020       Lab Results   Component Value Date    HGB 11.1 (L) 2024    HCT 33.5 (L) 2024    ABO O 10/11/2023    HEPBSAG Nonreactive 10/11/2023       Assessment/Plan   Problem List Items Addressed This Visit    None  Visit Diagnoses       38 weeks gestation of pregnancy    -  Primary            Continue prenatal vitamin.  Labs reviewed.    Follow up in 1 week for a routine prenatal visit.

## 2024-03-29 ENCOUNTER — ROUTINE PRENATAL (OUTPATIENT)
Dept: OBSTETRICS AND GYNECOLOGY | Facility: CLINIC | Age: 39
End: 2024-03-29
Payer: COMMERCIAL

## 2024-03-29 VITALS — DIASTOLIC BLOOD PRESSURE: 74 MMHG | BODY MASS INDEX: 26.06 KG/M2 | WEIGHT: 154.2 LBS | SYSTOLIC BLOOD PRESSURE: 118 MMHG

## 2024-03-29 DIAGNOSIS — Z3A.39 39 WEEKS GESTATION OF PREGNANCY (HHS-HCC): Primary | ICD-10-CM

## 2024-03-29 PROCEDURE — 99213 OFFICE O/P EST LOW 20 MIN: CPT | Performed by: OBSTETRICS & GYNECOLOGY

## 2024-03-29 NOTE — PROGRESS NOTES
Subjective   Patient ID [id number]   Anahi Vicente is a 38 y.o.  at 39w4d with a working estimated date of delivery of 2024, by Ultrasound who presents for a routine prenatal visit. She denies vaginal bleeding, leakage of fluid, decreased fetal movements, or contractions.    Chief Complaint   Patient presents with    Routine Prenatal Visit     Patient has no concerns at this time. Patient does not wish to discuss induction at this time. Urine is negative for glucose and protein.          Objective   Physical Exam  Weight: 69.9 kg (154 lb 3.2 oz)  Expected Total Weight Gain: 11.5 kg (25 lb)-16 kg (35 lb)   Pregravid BMI: 22.65  BP: 118/74  Fetal Heart Rate: 144 Fundal Height (cm): 37 cm    Prenatal Labs  Urine dip:  Lab Results   Component Value Date    KETONESU NEGATIVE 2020       Lab Results   Component Value Date    HGB 11.1 (L) 2024    HCT 33.5 (L) 2024    ABO O 10/11/2023    HEPBSAG Nonreactive 10/11/2023       Assessment/Plan   Problem List Items Addressed This Visit    None  Visit Diagnoses       39 weeks gestation of pregnancy    -  Primary            Continue prenatal vitamin.  Labs reviewed.    She wishes to hold off on induction until after her due date.  Follow up in 1 week for a routine prenatal visit.

## 2024-04-02 ENCOUNTER — ROUTINE PRENATAL (OUTPATIENT)
Dept: OBSTETRICS AND GYNECOLOGY | Facility: CLINIC | Age: 39
End: 2024-04-02
Payer: COMMERCIAL

## 2024-04-02 VITALS — WEIGHT: 155.4 LBS | BODY MASS INDEX: 26.26 KG/M2 | SYSTOLIC BLOOD PRESSURE: 110 MMHG | DIASTOLIC BLOOD PRESSURE: 70 MMHG

## 2024-04-02 DIAGNOSIS — Z3A.40 40 WEEKS GESTATION OF PREGNANCY (HHS-HCC): Primary | ICD-10-CM

## 2024-04-02 PROCEDURE — 99213 OFFICE O/P EST LOW 20 MIN: CPT | Performed by: OBSTETRICS & GYNECOLOGY

## 2024-04-02 NOTE — PROGRESS NOTES
Subjective   Patient ID [id number]   Anahi Vicente is a 38 y.o.  at 40w1d with a working estimated date of delivery of 2024, by Ultrasound who presents for a routine prenatal visit. She denies vaginal bleeding, leakage of fluid, decreased fetal movements, or contractions.    Chief Complaint   Patient presents with    Routine Prenatal Visit     PT is here today for routine prenatal visit. Denies contractions, still feeling baby move, pelvic cramping when she empties her bladder and slight pelvic pressure. Her urine is negative for protein and glucose.           Objective   Physical Exam  Weight: 70.5 kg (155 lb 6.4 oz)  Expected Total Weight Gain: 11.5 kg (25 lb)-16 kg (35 lb)   Pregravid BMI: 22.65  BP: 110/70  Fetal Heart Rate: 142 Fundal Height (cm): 37 cm    Prenatal Labs  Urine dip:  Lab Results   Component Value Date    KETONESU NEGATIVE 2020       Lab Results   Component Value Date    HGB 11.1 (L) 2024    HCT 33.5 (L) 2024    ABO O 10/11/2023    HEPBSAG Nonreactive 10/11/2023       Assessment/Plan   Problem List Items Addressed This Visit    None      Continue prenatal vitamin.  Labs reviewed.    Patient desires induction on .  Follow up in 4 weeks for postpartum visit.

## 2024-05-03 ENCOUNTER — POSTPARTUM VISIT (OUTPATIENT)
Dept: OBSTETRICS AND GYNECOLOGY | Facility: CLINIC | Age: 39
End: 2024-05-03
Payer: COMMERCIAL

## 2024-05-03 VITALS
DIASTOLIC BLOOD PRESSURE: 64 MMHG | SYSTOLIC BLOOD PRESSURE: 112 MMHG | WEIGHT: 135.2 LBS | HEIGHT: 65 IN | BODY MASS INDEX: 22.53 KG/M2

## 2024-05-03 DIAGNOSIS — K64.9 HEMORRHOIDS, UNSPECIFIED HEMORRHOID TYPE: Primary | ICD-10-CM

## 2024-05-03 ASSESSMENT — EDINBURGH POSTNATAL DEPRESSION SCALE (EPDS)
I HAVE FELT SAD OR MISERABLE: NOT VERY OFTEN
I HAVE BEEN ABLE TO LAUGH AND SEE THE FUNNY SIDE OF THINGS: AS MUCH AS I ALWAYS COULD
TOTAL SCORE: 6
THE THOUGHT OF HARMING MYSELF HAS OCCURRED TO ME: NEVER
THINGS HAVE BEEN GETTING ON TOP OF ME: NO, MOST OF THE TIME I HAVE COPED QUITE WELL
I HAVE FELT SCARED OR PANICKY FOR NO GOOD REASON: NO, NOT MUCH
I HAVE BEEN SO UNHAPPY THAT I HAVE BEEN CRYING: ONLY OCCASIONALLY
I HAVE LOOKED FORWARD WITH ENJOYMENT TO THINGS: AS MUCH AS I EVER DID
I HAVE BLAMED MYSELF UNNECESSARILY WHEN THINGS WENT WRONG: NOT VERY OFTEN
I HAVE BEEN SO UNHAPPY THAT I HAVE HAD DIFFICULTY SLEEPING: NOT VERY OFTEN
I HAVE BEEN ANXIOUS OR WORRIED FOR NO GOOD REASON: NO, NOT AT ALL

## 2024-05-03 NOTE — PROGRESS NOTES
Anahi Vicente is a 38 y.o. year old female patient.  PCP = Karol Rizvi MD    Chief Complaint   Patient presents with    Postpartum Care     Patient is here for her 4 week post partum visit. Patient is bottle feeding. Patient does not wish to discuss birth control options at this time and states she has not resumed sexual activity. Patient denies any post partum depression and has no concerns at this time.        HPI   Presents for postpartum checkup. She voices no complaints and is doing well. Denies any bowel or bladder problems. Denies any breast problems.  Patient is bottlefeeding and will use condoms for contraception.  Patient states she has had issues with hemorrhoids.    OB History          4    Para   2    Term   2       0    AB   2    Living   2         SAB   2    IAB   0    Ectopic   0    Multiple        Live Births   2                 Past Medical History:   Diagnosis Date    Chlamydial infection, unspecified     Chlamydia    Encounter for gynecological examination (general) (routine) without abnormal findings 10/28/2020    Women's annual routine gynecological examination    Other conditions influencing health status     History of pregnancy    Other conditions influencing health status     Menarche       Past Surgical History:   Procedure Laterality Date    OTHER SURGICAL HISTORY  10/28/2020    Surgery       Review of Systems:   Constitutional: No fever or chills  Respiratory: No shortness of breath, or cough  Cardiovascular: No chest pain or syncope  Breasts: No breast pain, no masses, no nipple discharge  Gastrointestinal: No nausea, vomiting, or diarrhea, no abdominal pain  Genitourinary: No dysuria or frequency  Gynecology: Negative except as noted in history of present illness  All other: All other systems reviewed and negative for complaint    Medication Documentation Review Audit       Reviewed by Marshall Armendariz MD (Physician) on 24 at 1316      Medication Order Taking?  "Sig Documenting Provider Last Dose Status   cholecalciferol (Vitamin D-3) 25 MCG (1000 UT) capsule [id number]  Take 2 capsules (50 mcg) by mouth once daily. Historical Provider, MD  Active   ondansetron ODT (Zofran-ODT) 8 mg disintegrating tablet [id number]  Take 1 tablet (8 mg) by mouth every 6 hours. Historical Provider, MD  Active   prenatal no115/iron/folic acid (PRENATAL 19 ORAL) [id number]  Take by mouth. Historical Provider, MD  Active                     /64   Ht 1.638 m (5' 4.5\")   Wt 61.3 kg (135 lb 3.2 oz)   Breastfeeding No   BMI 22.85 kg/m²     PHYSICAL EXAMINATION:  Well-developed, well nourished, in no acute distress, alert and oriented x three, is pleasant and cooperative.   HEENT: Clear. Pupils equal, round and reactive to light and accommodation. Extraocular muscles are intact. Oral mucosa pink without exudate.   NECK: No lymphadenopathy, no thyromegaly.  LUNGS: Clear bilaterally.  HEART: Regular rate and rhythm without murmurs.  ABDOMEN: Normoactive bowel sounds, soft and nontender, no guarding or rebound tenderness, no CVA tenderness.  EXTREMITIES: No clubbing, cyanosis or edema.  NEUROLOGIC:  Cranial nerves II-XII grossly intact.  :  Normal external female genitalia, normal vulva, normal vagina. Normal urethral meatus, urethra and bladder. Normal appearing cervix. Normal-sized uterus, no adnexal masses or tenderness.    Lab Results   Component Value Date    WBC 10.0 01/06/2024    HGB 11.1 (L) 01/06/2024    HCT 33.5 (L) 01/06/2024     01/06/2024     07/25/2022    K 3.6 07/25/2022     07/25/2022    CREATININE 0.90 07/25/2022    BUN 8 07/25/2022    CO2 25 07/25/2022    HGBA1C 5.1 04/04/2023           Problem List Items Addressed This Visit    None  Visit Diagnoses       Hemorrhoids, unspecified hemorrhoid type    -  Primary    Relevant Orders    Referral to General Surgery    Routine postpartum follow-up ([facility])                 Provider Impression:  1.  Postpartum " checkup  2.  Hemorrhoids  Patient referred to general surgeon regarding hemorrhoids.    Thank you for coming to your postpartum checkup. Your findings during the exam were normal.  Please return for your next visit in 2 months for annual exam.

## 2024-06-03 ENCOUNTER — OFFICE VISIT (OUTPATIENT)
Dept: SURGERY | Facility: CLINIC | Age: 39
End: 2024-06-03
Payer: COMMERCIAL

## 2024-06-03 VITALS
BODY MASS INDEX: 23.76 KG/M2 | HEART RATE: 86 BPM | HEIGHT: 64 IN | SYSTOLIC BLOOD PRESSURE: 96 MMHG | WEIGHT: 139.2 LBS | DIASTOLIC BLOOD PRESSURE: 60 MMHG

## 2024-06-03 DIAGNOSIS — K64.9 HEMORRHOIDS, UNSPECIFIED HEMORRHOID TYPE: Primary | ICD-10-CM

## 2024-06-03 PROCEDURE — 99203 OFFICE O/P NEW LOW 30 MIN: CPT | Performed by: SURGERY

## 2024-06-03 NOTE — LETTER
Fifi 3, 2024     Karol Rizvi MD  [address]  [facility]  [facility] [address]    Patient: Anahi Vicente   YOB: 1985   Date of Visit: 6/3/2024       Dear Dr. Karol Rizvi MD:    Thank you for referring Anahi Vicente to me for evaluation. Below are my notes for this consultation.  If you have questions, please do not hesitate to call me. I look forward to following your patient along with you.       Sincerely,     Melany Ashley MD      CC: Marshall Armendariz MD  ______________________________________________________________________________________    General Surgery Consultation    Patient: Anahi Vicente  : 1985  MRN: [id number]  Date of Consultation: 24    Primary Care Provider: Karol Rizvi MD  Referring Provider: Marshall Armendariz MD    Chief Complaint: Hemorrhoids    History of Present Illness: Anahi Vicente is a 38 y.o. old female seen at the request of Dr. Armendariz for evaluation of hemorrhoids.  She had a recent pregnancy with vaginal delivery on 24.  Starting about 7 months into her pregnancy, she had excess perianal tissue that would swell and give her soreness and aching.  This was worse with sitting.  She also had some bright red blood per rectum associated with this.  This was usually on tissue paper with wiping.  However, around the time of delivery she did have a couple occasions where was dripping into the toilet bowl.  This was her second pregnancy.  She denies any issues similar to this with her first pregnancy.  However, she also notes that this was a large baby.  She has not seen any bleeding since about 3 to 4 weeks postpartum.  She occasionally still gets pain with bowel movements.  She is having bowel movements every day to every other day.  These are soft and formed.  She is not on any stool softeners, fiber supplements, or laxatives, although she did take stool softeners around the time of delivery while she was in the hospital.  She reports drinking  "adequate water throughout the day.  She denies any family history of colon or rectal cancer or inflammatory bowel disease.  She has no previous colonoscopy.     Medical History:  Hemorrhoids    Surgical History:  No previous surgery    Home Medications:  Prior to Admission medications    Medication Sig Start Date End Date Taking? Authorizing Provider   cholecalciferol (Vitamin D-3) 25 MCG (1000 UT) capsule Take 2 capsules (50 mcg) by mouth once daily. 4/6/23   Historical Provider, MD   ondansetron ODT (Zofran-ODT) 8 mg disintegrating tablet Take 1 tablet (8 mg) by mouth every 6 hours. 7/25/22   Historical Provider, MD   prenatal no115/iron/folic acid (PRENATAL 19 ORAL) Take by mouth.    Historical Provider, MD     Allergies:  Cephalexin.    Family History:   No family history of colorectal cancer or inflammatory bowel disease.  Maternal grandmother and maternal aunt with breast cancer.  Mother with irregular heartbeat.    Social History:  Smoker, 10 cigarettes daily.  Drinks alcohol 2 times per week.  No drug use.    ROS:  Constitutional:  no fever, sweats, and chills  Cardiovascular: No chest pain  Respiratory: No cough or shortness of breath  Gastrointestinal: + Perianal pain (improving), blood per rectum (now resolved)  Genitourinary: + Breast cysts, history of UTI  Musculoskeletal: + Arthritis  Integumentary: no rashes  Neurological: no confusion  Endocrine: no heat or cold intolerance  Heme/Lymph: + Easy bruising or bleeding    Objective:  BP 96/60   Pulse 86   Ht 1.626 m (5' 4\")   Wt 63.1 kg (139 lb 3.2 oz)   BMI 23.89 kg/m²     Physical Exam:  Constitutional: No acute distress, conversant, pleasant  Neurologic: alert and oriented  Psych: appropriate affect  Ears, Nose, Mouth and Throat: mucus membranes moist  Pulmonary: No labored breathing  Cardiovascular: Regular rate and rhythm  Abdomen: Nondistended, BMI 23  Rectal: Very small noninflamed and not an thrombosed hemorrhoidal skin tag in the right " posterior location, no other external perianal abnormalities, no fissures.  Normal tone on digital rectal exam.  Musculoskeletal: Moves all extremities, no edema  Skin: no jaundice    Procedure:  Lighted anoscopy was performed in the prone jackknife position with the assistance of my medical assistant, Sergo.  This revealed minimally enlarged internal hemorrhoids.  These were not friable and did not bleed on contact with the scope.    Labs:  Labs from 1/6/24 reviewed: Hgb 11.1    Imaging:  No pertinent imaging available for review.    Assessment and Plan: Anahi Vicente is a 38 y.o. old female with hemorrhoids.  These were previously symptomatic during time of pregnancy.  Currently, her symptoms are significantly improved.  She does not have any issues with chronic constipation and had no issues with hemorrhoids prior to this pregnancy.  I suspect that this is a self limiting condition and we discussed that with a little more time this will likely completely resolved.  I encouraged her to continue to drink adequate water throughout the day to keep bowel movements soft and regular and take a stool softener on an as-needed basis.  Given that she has seen blood per rectum, I offered colonoscopy, but patient declined.  I think this is reasonable given her lack of family history, young age, and bleeding isolated to her immediate postpartum period.  I have recommended screening colonoscopy at age 46.  I will see her on an as-needed basis.    Melany Ashley MD  6/3/2024

## 2024-06-03 NOTE — PROGRESS NOTES
General Surgery Consultation    Patient: Anahi Vicente  : 1985  MRN: [id number]  Date of Consultation: 24    Primary Care Provider: Karol Rizvi MD  Referring Provider: Marshall Armendariz MD    Chief Complaint: Hemorrhoids    History of Present Illness: Anahi Vicente is a 38 y.o. old female seen at the request of Dr. Armendariz for evaluation of hemorrhoids.  She had a recent pregnancy with vaginal delivery on 24.  Starting about 7 months into her pregnancy, she had excess perianal tissue that would swell and give her soreness and aching.  This was worse with sitting.  She also had some bright red blood per rectum associated with this.  This was usually on tissue paper with wiping.  However, around the time of delivery she did have a couple occasions where was dripping into the toilet bowl.  This was her second pregnancy.  She denies any issues similar to this with her first pregnancy.  However, she also notes that this was a large baby.  She has not seen any bleeding since about 3 to 4 weeks postpartum.  She occasionally still gets pain with bowel movements.  She is having bowel movements every day to every other day.  These are soft and formed.  She is not on any stool softeners, fiber supplements, or laxatives, although she did take stool softeners around the time of delivery while she was in the hospital.  She reports drinking adequate water throughout the day.  She denies any family history of colon or rectal cancer or inflammatory bowel disease.  She has no previous colonoscopy.     Medical History:  Hemorrhoids    Surgical History:  No previous surgery    Home Medications:  Prior to Admission medications    Medication Sig Start Date End Date Taking? Authorizing Provider   cholecalciferol (Vitamin D-3) 25 MCG (1000 UT) capsule Take 2 capsules (50 mcg) by mouth once daily. 23   Historical Provider, MD   ondansetron ODT (Zofran-ODT) 8 mg disintegrating tablet Take 1 tablet (8 mg) by mouth every 6  "hours. 7/25/22   Historical Provider, MD   prenatal no115/iron/folic acid (PRENATAL 19 ORAL) Take by mouth.    Historical Provider, MD     Allergies:  Cephalexin.    Family History:   No family history of colorectal cancer or inflammatory bowel disease.  Maternal grandmother and maternal aunt with breast cancer.  Mother with irregular heartbeat.    Social History:  Smoker, 10 cigarettes daily.  Drinks alcohol 2 times per week.  No drug use.    ROS:  Constitutional:  no fever, sweats, and chills  Cardiovascular: No chest pain  Respiratory: No cough or shortness of breath  Gastrointestinal: + Perianal pain (improving), blood per rectum (now resolved)  Genitourinary: + Breast cysts, history of UTI  Musculoskeletal: + Arthritis  Integumentary: no rashes  Neurological: no confusion  Endocrine: no heat or cold intolerance  Heme/Lymph: + Easy bruising or bleeding    Objective:  BP 96/60   Pulse 86   Ht 1.626 m (5' 4\")   Wt 63.1 kg (139 lb 3.2 oz)   BMI 23.89 kg/m²     Physical Exam:  Constitutional: No acute distress, conversant, pleasant  Neurologic: alert and oriented  Psych: appropriate affect  Ears, Nose, Mouth and Throat: mucus membranes moist  Pulmonary: No labored breathing  Cardiovascular: Regular rate and rhythm  Abdomen: Nondistended, BMI 23  Rectal: Very small noninflamed and not an thrombosed hemorrhoidal skin tag in the right posterior location, no other external perianal abnormalities, no fissures.  Normal tone on digital rectal exam.  Musculoskeletal: Moves all extremities, no edema  Skin: no jaundice    Procedure:  Lighted anoscopy was performed in the prone jackknife position with the assistance of my medical assistant, Sergo.  This revealed minimally enlarged internal hemorrhoids.  These were not friable and did not bleed on contact with the scope.    Labs:  Labs from 1/6/24 reviewed: Hgb 11.1    Imaging:  No pertinent imaging available for review.    Assessment and Plan: Anahi Vicente is a 38 " y.o. old female with hemorrhoids.  These were previously symptomatic during time of pregnancy.  Currently, her symptoms are significantly improved.  She does not have any issues with chronic constipation and had no issues with hemorrhoids prior to this pregnancy.  I suspect that this is a self limiting condition and we discussed that with a little more time this will likely completely resolved.  I encouraged her to continue to drink adequate water throughout the day to keep bowel movements soft and regular and take a stool softener on an as-needed basis.  Given that she has seen blood per rectum, I offered colonoscopy, but patient declined.  I think this is reasonable given her lack of family history, young age, and bleeding isolated to her immediate postpartum period.  I have recommended screening colonoscopy at age 46.  I will see her on an as-needed basis.    Melany Ashley MD  6/3/2024

## 2024-06-03 NOTE — LETTER
Fifi 3, 2024     Karol Rizvi MD  [address] [facility] [address]  [facility]  [facility] [address]    Patient: Anahi Vicente   YOB: 1985   Date of Visit: 6/3/2024       Dear Dr. Karol Rizvi MD:    Thank you for referring Anahi Vicente to me for evaluation. Below are my notes for this consultation.  If you have questions, please do not hesitate to call me. I look forward to following your patient along with you.       Sincerely,     Melany Ashley MD      CC: Marshall Armendariz MD  ______________________________________________________________________________________    General Surgery Consultation    Patient: Anahi Vicente  : 1985  MRN: [id number]  Date of Consultation: 24    Primary Care Provider: Karol Rizvi MD  Referring Provider: Marshall Armendariz MD    Chief Complaint: Hemorrhoids    History of Present Illness: Anahi Vicente is a 38 y.o. old female seen at the request of Dr. Armendariz for evaluation of hemorrhoids.  She had a recent pregnancy with vaginal delivery on 24.  Starting about 7 months into her pregnancy, she had excess perianal tissue that would swell and give her soreness and aching.  This was worse with sitting.  She also had some bright red blood per rectum associated with this.  This was usually on tissue paper with wiping.  However, around the time of delivery she did have a couple occasions where was dripping into the toilet bowl.  This was her second pregnancy.  She denies any issues similar to this with her first pregnancy.  However, she also notes that this was a large baby.  She has not seen any bleeding since about 3 to 4 weeks postpartum.  She occasionally still gets pain with bowel movements.  She is having bowel movements every day to every other day.  These are soft and formed.  She is not on any stool softeners, fiber supplements, or laxatives, although she did take stool softeners around the time of delivery while she was in the hospital.  She reports drinking  "adequate water throughout the day.  She denies any family history of colon or rectal cancer or inflammatory bowel disease.  She has no previous colonoscopy.     Medical History:  Hemorrhoids    Surgical History:  No previous surgery    Home Medications:  Prior to Admission medications    Medication Sig Start Date End Date Taking? Authorizing Provider   cholecalciferol (Vitamin D-3) 25 MCG (1000 UT) capsule Take 2 capsules (50 mcg) by mouth once daily. 4/6/23   Historical Provider, MD   ondansetron ODT (Zofran-ODT) 8 mg disintegrating tablet Take 1 tablet (8 mg) by mouth every 6 hours. 7/25/22   Historical Provider, MD   prenatal no115/iron/folic acid (PRENATAL 19 ORAL) Take by mouth.    Historical Provider, MD     Allergies:  Cephalexin.    Family History:   No family history of colorectal cancer or inflammatory bowel disease.  Maternal grandmother and maternal aunt with breast cancer.  Mother with irregular heartbeat.    Social History:  Smoker, 10 cigarettes daily.  Drinks alcohol 2 times per week.  No drug use.    ROS:  Constitutional:  no fever, sweats, and chills  Cardiovascular: No chest pain  Respiratory: No cough or shortness of breath  Gastrointestinal: + Perianal pain (improving), blood per rectum (now resolved)  Genitourinary: + Breast cysts, history of UTI  Musculoskeletal: + Arthritis  Integumentary: no rashes  Neurological: no confusion  Endocrine: no heat or cold intolerance  Heme/Lymph: + Easy bruising or bleeding    Objective:  BP 96/60   Pulse 86   Ht 1.626 m (5' 4\")   Wt 63.1 kg (139 lb 3.2 oz)   BMI 23.89 kg/m²     Physical Exam:  Constitutional: No acute distress, conversant, pleasant  Neurologic: alert and oriented  Psych: appropriate affect  Ears, Nose, Mouth and Throat: mucus membranes moist  Pulmonary: No labored breathing  Cardiovascular: Regular rate and rhythm  Abdomen: Nondistended, BMI 23  Rectal: Very small noninflamed and not an thrombosed hemorrhoidal skin tag in the right " posterior location, no other external perianal abnormalities, no fissures.  Normal tone on digital rectal exam.  Musculoskeletal: Moves all extremities, no edema  Skin: no jaundice    Procedure:  Lighted anoscopy was performed in the prone jackknife position with the assistance of my medical assistant, Sergo.  This revealed minimally enlarged internal hemorrhoids.  These were not friable and did not bleed on contact with the scope.    Labs:  Labs from 1/6/24 reviewed: Hgb 11.1    Imaging:  No pertinent imaging available for review.    Assessment and Plan: Anahi Vicente is a 38 y.o. old female with hemorrhoids.  These were previously symptomatic during time of pregnancy.  Currently, her symptoms are significantly improved.  She does not have any issues with chronic constipation and had no issues with hemorrhoids prior to this pregnancy.  I suspect that this is a self limiting condition and we discussed that with a little more time this will likely completely resolved.  I encouraged her to continue to drink adequate water throughout the day to keep bowel movements soft and regular and take a stool softener on an as-needed basis.  Given that she has seen blood per rectum, I offered colonoscopy, but patient declined.  I think this is reasonable given her lack of family history, young age, and bleeding isolated to her immediate postpartum period.  I have recommended screening colonoscopy at age 46.  I will see her on an as-needed basis.    Melany Ashley MD  6/3/2024

## 2024-07-10 ENCOUNTER — APPOINTMENT (OUTPATIENT)
Dept: OBSTETRICS AND GYNECOLOGY | Facility: CLINIC | Age: 39
End: 2024-07-10
Payer: COMMERCIAL

## 2024-08-28 ENCOUNTER — APPOINTMENT (OUTPATIENT)
Dept: OBSTETRICS AND GYNECOLOGY | Facility: CLINIC | Age: 39
End: 2024-08-28
Payer: COMMERCIAL

## 2024-10-16 ENCOUNTER — APPOINTMENT (OUTPATIENT)
Dept: OBSTETRICS AND GYNECOLOGY | Facility: CLINIC | Age: 39
End: 2024-10-16
Payer: COMMERCIAL

## 2024-10-16 ENCOUNTER — LAB (OUTPATIENT)
Dept: LAB | Facility: LAB | Age: 39
End: 2024-10-16
Payer: COMMERCIAL

## 2024-10-16 VITALS
DIASTOLIC BLOOD PRESSURE: 64 MMHG | SYSTOLIC BLOOD PRESSURE: 112 MMHG | HEIGHT: 64 IN | WEIGHT: 146.8 LBS | BODY MASS INDEX: 25.06 KG/M2

## 2024-10-16 DIAGNOSIS — Z01.419 ENCOUNTER FOR GYNECOLOGICAL EXAMINATION WITHOUT ABNORMAL FINDING: ICD-10-CM

## 2024-10-16 DIAGNOSIS — Z12.4 ENCOUNTER FOR SCREENING FOR CERVICAL CANCER: ICD-10-CM

## 2024-10-16 DIAGNOSIS — L30.9 DERMATITIS, UNSPECIFIED: Primary | ICD-10-CM

## 2024-10-16 LAB
ALBUMIN SERPL BCP-MCNC: 4.6 G/DL (ref 3.4–5)
ALP SERPL-CCNC: 65 U/L (ref 33–110)
ALT SERPL W P-5'-P-CCNC: 11 U/L (ref 7–45)
ANION GAP SERPL CALC-SCNC: 16 MMOL/L (ref 10–20)
APPEARANCE UR: ABNORMAL
AST SERPL W P-5'-P-CCNC: 14 U/L (ref 9–39)
BASOPHILS # BLD AUTO: 0.09 X10*3/UL (ref 0–0.1)
BASOPHILS NFR BLD AUTO: 1.1 %
BILIRUB SERPL-MCNC: 0.5 MG/DL (ref 0–1.2)
BILIRUB UR STRIP.AUTO-MCNC: NEGATIVE MG/DL
BUN SERPL-MCNC: 11 MG/DL (ref 6–23)
CALCIUM SERPL-MCNC: 9.7 MG/DL (ref 8.6–10.3)
CAOX CRY #/AREA UR COMP ASSIST: ABNORMAL /HPF
CHLORIDE SERPL-SCNC: 103 MMOL/L (ref 98–107)
CO2 SERPL-SCNC: 22 MMOL/L (ref 21–32)
COLOR UR: YELLOW
CREAT SERPL-MCNC: 0.8 MG/DL (ref 0.5–1.05)
EGFRCR SERPLBLD CKD-EPI 2021: >90 ML/MIN/1.73M*2
EOSINOPHIL # BLD AUTO: 0.18 X10*3/UL (ref 0–0.7)
EOSINOPHIL NFR BLD AUTO: 2.2 %
ERYTHROCYTE [DISTWIDTH] IN BLOOD BY AUTOMATED COUNT: 12.6 % (ref 11.5–14.5)
GLUCOSE SERPL-MCNC: 75 MG/DL (ref 74–99)
GLUCOSE UR STRIP.AUTO-MCNC: NORMAL MG/DL
HCT VFR BLD AUTO: 42.4 % (ref 36–46)
HGB BLD-MCNC: 13.9 G/DL (ref 12–16)
IMM GRANULOCYTES # BLD AUTO: 0.02 X10*3/UL (ref 0–0.7)
IMM GRANULOCYTES NFR BLD AUTO: 0.2 % (ref 0–0.9)
KETONES UR STRIP.AUTO-MCNC: NEGATIVE MG/DL
LEUKOCYTE ESTERASE UR QL STRIP.AUTO: ABNORMAL
LYMPHOCYTES # BLD AUTO: 2.64 X10*3/UL (ref 1.2–4.8)
LYMPHOCYTES NFR BLD AUTO: 32.3 %
MCH RBC QN AUTO: 31.4 PG (ref 26–34)
MCHC RBC AUTO-ENTMCNC: 32.8 G/DL (ref 32–36)
MCV RBC AUTO: 96 FL (ref 80–100)
MONOCYTES # BLD AUTO: 0.57 X10*3/UL (ref 0.1–1)
MONOCYTES NFR BLD AUTO: 7 %
MUCOUS THREADS #/AREA URNS AUTO: ABNORMAL /LPF
NEUTROPHILS # BLD AUTO: 4.67 X10*3/UL (ref 1.2–7.7)
NEUTROPHILS NFR BLD AUTO: 57.2 %
NITRITE UR QL STRIP.AUTO: NEGATIVE
NRBC BLD-RTO: 0 /100 WBCS (ref 0–0)
PH UR STRIP.AUTO: 5.5 [PH]
PLATELET # BLD AUTO: 345 X10*3/UL (ref 150–450)
POTASSIUM SERPL-SCNC: 3.8 MMOL/L (ref 3.5–5.3)
PROT SERPL-MCNC: 7 G/DL (ref 6.4–8.2)
PROT UR STRIP.AUTO-MCNC: ABNORMAL MG/DL
RBC # BLD AUTO: 4.42 X10*6/UL (ref 4–5.2)
RBC # UR STRIP.AUTO: NEGATIVE /UL
RBC #/AREA URNS AUTO: ABNORMAL /HPF
SODIUM SERPL-SCNC: 137 MMOL/L (ref 136–145)
SP GR UR STRIP.AUTO: 1.03
SQUAMOUS #/AREA URNS AUTO: ABNORMAL /HPF
UROBILINOGEN UR STRIP.AUTO-MCNC: NORMAL MG/DL
WBC # BLD AUTO: 8.2 X10*3/UL (ref 4.4–11.3)
WBC #/AREA URNS AUTO: ABNORMAL /HPF
YEAST BUDDING #/AREA UR COMP ASSIST: PRESENT /HPF

## 2024-10-16 PROCEDURE — 36415 COLL VENOUS BLD VENIPUNCTURE: CPT

## 2024-10-16 PROCEDURE — 81001 URINALYSIS AUTO W/SCOPE: CPT

## 2024-10-16 PROCEDURE — 3008F BODY MASS INDEX DOCD: CPT | Performed by: OBSTETRICS & GYNECOLOGY

## 2024-10-16 PROCEDURE — 86038 ANTINUCLEAR ANTIBODIES: CPT

## 2024-10-16 PROCEDURE — 88175 CYTOPATH C/V AUTO FLUID REDO: CPT

## 2024-10-16 PROCEDURE — 85025 COMPLETE CBC W/AUTO DIFF WBC: CPT

## 2024-10-16 PROCEDURE — 99459 PELVIC EXAMINATION: CPT | Performed by: OBSTETRICS & GYNECOLOGY

## 2024-10-16 PROCEDURE — 86225 DNA ANTIBODY NATIVE: CPT

## 2024-10-16 PROCEDURE — 99395 PREV VISIT EST AGE 18-39: CPT | Performed by: OBSTETRICS & GYNECOLOGY

## 2024-10-16 PROCEDURE — 80053 COMPREHEN METABOLIC PANEL: CPT

## 2024-10-16 ASSESSMENT — PAIN SCALES - GENERAL: PAINLEVEL_OUTOF10: 0-NO PAIN

## 2024-10-16 NOTE — PROGRESS NOTES
"April Vicente is a 38 y.o. female who is here for a routine exam. PCP = Karol Rizvi MD    Chief Complaint   Patient presents with    Gynecologic Exam     Patient is here for yearly exam and pap test. Patient does not do regular self breast exams and has no concerns at this time. LMP: 24.          Presents for annual exam. She voices no complaints and is doing well. Denies any bowel or bladder problems. Denies any breast problems.  Using condoms for contraception.    OB History          4    Para   2    Term   2       0    AB   2    Living   2         SAB   2    IAB   0    Ectopic   0    Multiple        Live Births   2                  Social History     Substance and Sexual Activity   Sexual Activity Yes    Birth control/protection: None     Current contraception: Condoms    Past Medical History:   Diagnosis Date    Chlamydial infection, unspecified     Chlamydia    Encounter for gynecological examination (general) (routine) without abnormal findings 10/28/2020    Women's annual routine gynecological examination    Other conditions influencing health status     History of pregnancy    Other conditions influencing health status     Menarche       Past Surgical History:   Procedure Laterality Date    OTHER SURGICAL HISTORY  10/28/2020    Surgery       Past med hx and past surg hx reviewed and notable for: none    Review of Systems:   Constitutional: No fever or chills  Respiratory: No shortness of breath, or cough  Cardiovascular: No chest pain or syncope  Breasts: No breast pain, no masses, no nipple discharge  Gastrointestinal: No nausea, vomiting, or diarrhea, no abdominal pain  Genitourinary: No dysuria or frequency  Gynecology: Negative except as noted in history of present illness  All other: All other systems reviewed and negative for complaint    Objective   /64   Ht 1.626 m (5' 4\")   Wt 66.6 kg (146 lb 12.8 oz)   LMP 2024   Breastfeeding Unknown   BMI 25.20 kg/m² "     PHYSICAL EXAMINATION:  Chaperone present for exam:  Jaylene Reece, LPN  Well-developed, well nourished, in no acute distress, alert and oriented x three, is pleasant and cooperative.   HEENT: Clear. Pupils equal, round and reactive to light and accommodation. Extraocular muscles are intact. Oral mucosa pink without exudate.   NECK: No lymphadenopathy, no thyromegaly.  BREASTS: Symmetric, no palpable masses. No nipple discharge or retraction.  LUNGS: Clear bilaterally.  HEART: Regular rate and rhythm without murmurs.  ABDOMEN: Normoactive bowel sounds, soft and nontender, no guarding or rebound tenderness, no CVA tenderness.  EXTREMITIES: No clubbing, cyanosis or edema.  NEUROLOGIC:  Cranial nerves II-XII grossly intact.  :  Normal external female genitalia, normal vulva, normal vagina. Normal urethral meatus, urethra and bladder. Normal appearing cervix. Normal-sized uterus, no adnexal masses or tenderness. Pap smear performed today.      Actions performed during this visit include:  - Clinical breast exam  - Clinical pelvic exam  - No orders of the defined types were placed in this encounter.       Problem List Items Addressed This Visit    None  Visit Diagnoses       Encounter for gynecological examination without abnormal finding        Relevant Orders    THINPREP PAP TEST    Encounter for screening for cervical cancer        Relevant Orders    THINPREP PAP TEST             Provider Impression:  1.  Annual    Thank you for coming to your annual exam. Your findings during the exam were normal.  Please return for your next visit in 1 year.

## 2024-10-17 LAB
ANA PATTERN: ABNORMAL
ANA SER QL HEP2 SUBST: POSITIVE
ANA TITR SER IF: ABNORMAL {TITER}
DSDNA AB SER-ACNC: 1 IU/ML

## 2024-10-28 LAB
CYTOLOGY CMNT CVX/VAG CYTO-IMP: NORMAL
LAB AP HPV GENOTYPE QUESTION: YES
LAB AP HPV HR: NORMAL
LABORATORY COMMENT REPORT: NORMAL
LMP START DATE: NORMAL
PATH REPORT.TOTAL CANCER: NORMAL

## 2024-11-01 ENCOUNTER — TELEPHONE (OUTPATIENT)
Dept: OBSTETRICS AND GYNECOLOGY | Facility: CLINIC | Age: 39
End: 2024-11-01
Payer: COMMERCIAL

## 2024-11-01 DIAGNOSIS — B96.89 BV (BACTERIAL VAGINOSIS): Primary | ICD-10-CM

## 2024-11-01 DIAGNOSIS — N76.0 BV (BACTERIAL VAGINOSIS): Primary | ICD-10-CM

## 2024-11-01 RX ORDER — METRONIDAZOLE 500 MG/1
500 TABLET ORAL 2 TIMES DAILY
Qty: 14 TABLET | Refills: 0 | Status: SHIPPED | OUTPATIENT
Start: 2024-11-01 | End: 2024-11-08

## 2025-10-20 ENCOUNTER — APPOINTMENT (OUTPATIENT)
Dept: OBSTETRICS AND GYNECOLOGY | Facility: CLINIC | Age: 40
End: 2025-10-20
Payer: COMMERCIAL